# Patient Record
Sex: MALE | Race: WHITE | NOT HISPANIC OR LATINO | Employment: STUDENT | URBAN - METROPOLITAN AREA
[De-identification: names, ages, dates, MRNs, and addresses within clinical notes are randomized per-mention and may not be internally consistent; named-entity substitution may affect disease eponyms.]

---
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
## 2025-05-09 ENCOUNTER — APPOINTMENT (OUTPATIENT)
Dept: URGENT CARE | Facility: CLINIC | Age: 20
End: 2025-05-09

## 2025-06-20 ENCOUNTER — APPOINTMENT (EMERGENCY)
Dept: RADIOLOGY | Facility: HOSPITAL | Age: 20
End: 2025-06-20
Payer: COMMERCIAL

## 2025-06-20 ENCOUNTER — HOSPITAL ENCOUNTER (EMERGENCY)
Facility: HOSPITAL | Age: 20
Discharge: HOME | End: 2025-06-20
Attending: EMERGENCY MEDICINE | Admitting: EMERGENCY MEDICINE
Payer: COMMERCIAL

## 2025-06-20 VITALS
RESPIRATION RATE: 16 BRPM | HEART RATE: 80 BPM | SYSTOLIC BLOOD PRESSURE: 120 MMHG | TEMPERATURE: 98.7 F | OXYGEN SATURATION: 100 % | DIASTOLIC BLOOD PRESSURE: 80 MMHG

## 2025-06-20 DIAGNOSIS — S42.009A CLAVICLE FRACTURE: Primary | ICD-10-CM

## 2025-06-20 PROCEDURE — 71045 X-RAY EXAM CHEST 1 VIEW: CPT

## 2025-06-20 PROCEDURE — 99284 EMERGENCY DEPT VISIT MOD MDM: CPT | Performed by: EMERGENCY MEDICINE

## 2025-06-20 PROCEDURE — 99284 EMERGENCY DEPT VISIT MOD MDM: CPT

## 2025-06-20 PROCEDURE — 73000 X-RAY EXAM OF COLLAR BONE: CPT

## 2025-06-20 RX ORDER — IBUPROFEN 600 MG/1
600 TABLET, FILM COATED ORAL ONCE
Status: COMPLETED | OUTPATIENT
Start: 2025-06-20 | End: 2025-06-20

## 2025-06-20 RX ORDER — IBUPROFEN 600 MG/1
600 TABLET, FILM COATED ORAL EVERY 6 HOURS PRN
Qty: 30 TABLET | Refills: 0 | Status: SHIPPED | OUTPATIENT
Start: 2025-06-20

## 2025-06-20 RX ADMIN — IBUPROFEN 600 MG: 600 TABLET ORAL at 15:56
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
## 2025-06-20 NOTE — Clinical Note
Hudson Waldron was seen and treated in our emergency department on 6/20/2025.                Diagnosis:     Hudson  .    He may return on this date:     Unable to use right arm/lift until cleared by orthopedics     If you have any questions or concerns, please don't hesitate to call.      Mohit Garcia, DO    ______________________________           _______________          _______________  Hospital Representative                              Date                                Time

## 2025-06-20 NOTE — DISCHARGE INSTRUCTIONS
Wear your shoulder sling throughout the day and remove to shower and change  Follow up with orthopedics. A referral was placed and [facility] should call to schedule; alternatively you can call at the number provided

## 2025-06-25 ENCOUNTER — OFFICE VISIT (OUTPATIENT)
Dept: OBGYN CLINIC | Facility: CLINIC | Age: 20
End: 2025-06-25
Payer: COMMERCIAL

## 2025-06-25 ENCOUNTER — APPOINTMENT (OUTPATIENT)
Dept: RADIOLOGY | Facility: CLINIC | Age: 20
End: 2025-06-25
Attending: STUDENT IN AN ORGANIZED HEALTH CARE EDUCATION/TRAINING PROGRAM
Payer: COMMERCIAL

## 2025-06-25 VITALS — BODY MASS INDEX: 23.55 KG/M2 | WEIGHT: 159 LBS | HEIGHT: 69 IN

## 2025-06-25 DIAGNOSIS — M89.8X1 PAIN OF LEFT CLAVICLE: ICD-10-CM

## 2025-06-25 DIAGNOSIS — M89.8X1 PAIN OF RIGHT CLAVICLE: ICD-10-CM

## 2025-06-25 DIAGNOSIS — S42.021A CLOSED DISPLACED FRACTURE OF SHAFT OF RIGHT CLAVICLE, INITIAL ENCOUNTER: Primary | ICD-10-CM

## 2025-06-25 PROCEDURE — 73000 X-RAY EXAM OF COLLAR BONE: CPT

## 2025-06-25 PROCEDURE — 99204 OFFICE O/P NEW MOD 45 MIN: CPT | Performed by: STUDENT IN AN ORGANIZED HEALTH CARE EDUCATION/TRAINING PROGRAM
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
## 2025-06-25 NOTE — LETTER
June 25, 2025     Patient: Hudson Waldron  YOB: 2005  Date of Visit: 6/25/2025      To Whom it May Concern:    Hudson Waldron is under my professional care. Hudson was seen in my office on 6/25/2025. Hudson is not cleared to return to work at this time. He will be re-evaluated in 2 weeks.     If you have any questions or concerns, please don't hesitate to call.          Sincerely,          Alexandru Goodrich MD        CC: No Recipients

## 2025-06-26 ENCOUNTER — TELEPHONE (OUTPATIENT)
Age: 20
End: 2025-06-26

## 2025-06-26 RX ORDER — SODIUM CHLORIDE, SODIUM LACTATE, POTASSIUM CHLORIDE, CALCIUM CHLORIDE 600; 310; 30; 20 MG/100ML; MG/100ML; MG/100ML; MG/100ML
20 INJECTION, SOLUTION INTRAVENOUS CONTINUOUS
OUTPATIENT
Start: 2025-06-26

## 2025-06-26 RX ORDER — CHLORHEXIDINE GLUCONATE 40 MG/ML
SOLUTION TOPICAL DAILY PRN
OUTPATIENT
Start: 2025-06-26

## 2025-06-26 RX ORDER — CEFAZOLIN SODIUM 2 G/50ML
2000 SOLUTION INTRAVENOUS ONCE
OUTPATIENT
Start: 2025-06-26 | End: 2025-06-26

## 2025-06-26 RX ORDER — CHLORHEXIDINE GLUCONATE ORAL RINSE 1.2 MG/ML
15 SOLUTION DENTAL ONCE
OUTPATIENT
Start: 2025-06-26 | End: 2025-06-26

## 2025-06-26 NOTE — TELEPHONE ENCOUNTER
Caller: patient mom Lety     Doctor: Dr. Goodrich     Reason for call: calling to schedule sx,  said patient was seen yesterday and this was discussed in office.     Call back#: [phone]    Thank you.

## 2025-06-27 ENCOUNTER — LAB (OUTPATIENT)
Dept: LAB | Facility: HOSPITAL | Age: 20
End: 2025-06-27
Payer: COMMERCIAL

## 2025-06-27 ENCOUNTER — TELEPHONE (OUTPATIENT)
Dept: OBGYN CLINIC | Facility: CLINIC | Age: 20
End: 2025-06-27

## 2025-06-27 DIAGNOSIS — S42.021A CLOSED DISPLACED FRACTURE OF SHAFT OF RIGHT CLAVICLE, INITIAL ENCOUNTER: ICD-10-CM

## 2025-06-27 LAB
ALBUMIN SERPL BCG-MCNC: 4.2 G/DL (ref 3.5–5)
ALP SERPL-CCNC: 57 U/L (ref 34–104)
ALT SERPL W P-5'-P-CCNC: 12 U/L (ref 7–52)
ANION GAP SERPL CALCULATED.3IONS-SCNC: 8 MMOL/L (ref 4–13)
AST SERPL W P-5'-P-CCNC: 15 U/L (ref 13–39)
BASOPHILS # BLD AUTO: 0.05 THOUSANDS/ÂΜL (ref 0–0.1)
BASOPHILS NFR BLD AUTO: 1 % (ref 0–1)
BILIRUB SERPL-MCNC: 0.59 MG/DL (ref 0.2–1)
BUN SERPL-MCNC: 23 MG/DL (ref 5–25)
CALCIUM SERPL-MCNC: 9.3 MG/DL (ref 8.4–10.2)
CHLORIDE SERPL-SCNC: 102 MMOL/L (ref 96–108)
CO2 SERPL-SCNC: 29 MMOL/L (ref 21–32)
CREAT SERPL-MCNC: 1.03 MG/DL (ref 0.6–1.3)
EOSINOPHIL # BLD AUTO: 0.2 THOUSAND/ÂΜL (ref 0–0.61)
EOSINOPHIL NFR BLD AUTO: 2 % (ref 0–6)
ERYTHROCYTE [DISTWIDTH] IN BLOOD BY AUTOMATED COUNT: 13.5 % (ref 11.6–15.1)
GFR SERPL CREATININE-BSD FRML MDRD: 104 ML/MIN/1.73SQ M
GLUCOSE P FAST SERPL-MCNC: 78 MG/DL (ref 65–99)
HCT VFR BLD AUTO: 47.7 % (ref 36.5–49.3)
HGB BLD-MCNC: 15.4 G/DL (ref 12–17)
IMM GRANULOCYTES # BLD AUTO: 0.03 THOUSAND/UL (ref 0–0.2)
IMM GRANULOCYTES NFR BLD AUTO: 0 % (ref 0–2)
LYMPHOCYTES # BLD AUTO: 1.84 THOUSANDS/ÂΜL (ref 0.6–4.47)
LYMPHOCYTES NFR BLD AUTO: 22 % (ref 14–44)
MCH RBC QN AUTO: 29.6 PG (ref 26.8–34.3)
MCHC RBC AUTO-ENTMCNC: 32.3 G/DL (ref 31.4–37.4)
MCV RBC AUTO: 92 FL (ref 82–98)
MONOCYTES # BLD AUTO: 0.88 THOUSAND/ÂΜL (ref 0.17–1.22)
MONOCYTES NFR BLD AUTO: 11 % (ref 4–12)
NEUTROPHILS # BLD AUTO: 5.21 THOUSANDS/ÂΜL (ref 1.85–7.62)
NEUTS SEG NFR BLD AUTO: 64 % (ref 43–75)
NRBC BLD AUTO-RTO: 0 /100 WBCS
PLATELET # BLD AUTO: 244 THOUSANDS/UL (ref 149–390)
PMV BLD AUTO: 11.5 FL (ref 8.9–12.7)
POTASSIUM SERPL-SCNC: 4 MMOL/L (ref 3.5–5.3)
PROT SERPL-MCNC: 6.5 G/DL (ref 6.4–8.4)
RBC # BLD AUTO: 5.21 MILLION/UL (ref 3.88–5.62)
SODIUM SERPL-SCNC: 139 MMOL/L (ref 135–147)
WBC # BLD AUTO: 8.21 THOUSAND/UL (ref 4.31–10.16)

## 2025-06-27 PROCEDURE — 36415 COLL VENOUS BLD VENIPUNCTURE: CPT

## 2025-06-27 PROCEDURE — 80053 COMPREHEN METABOLIC PANEL: CPT

## 2025-06-27 PROCEDURE — 85025 COMPLETE CBC W/AUTO DIFF WBC: CPT

## 2025-06-27 NOTE — PRE-PROCEDURE INSTRUCTIONS
Pre-Surgery Instructions:   Medication Instructions    ibuprofen (MOTRIN) 600 mg tablet Stop taking 7 days prior to surgery.    Multiple Vitamin (Multi-Vitamin Daily) TABS Stop taking 7 days prior to surgery.   Medication instructions for day of surgery reviewed. Please take all instructed medications with only a sip of water. Please do not take any over the counter (non-prescribed) vitamins or supplements for one week prior to date of surgery.      You will receive a call one business day prior to surgery with an arrival time and hospital directions. If your surgery is scheduled on a Monday, the hospital will be calling you on the Friday prior to your surgery. If you have not heard from anyone by 8pm, please call the hospital supervisor through the hospital  at [phone]. ([place] [phone] or [place] [phone]).    Do not eat or drink anything after midnight the night before your surgery, including candy, mints, lifesavers, or chewing gum. Do not drink alcohol 24hrs before your surgery. Try not to smoke at least 24hrs before your surgery.       Follow the pre surgery showering instructions as listed in the “My Surgical Experience Booklet” or otherwise provided by your surgeon's office. Do not use a blade to shave the surgical area 1 week before surgery. It is okay to use a clean electric clippers up to 24 hours before surgery. Do not apply any lotions, creams, including makeup, cologne, deodorant, or perfumes after showering on the day of your surgery. Do not use dry shampoo, hair spray, hair gel, or any type of hair products.     No contact lenses, eye make-up, or artificial eyelashes. Remove nail polish, including gel polish, and any artificial, gel, or acrylic nails if possible. Remove all jewelry including rings and body piercing jewelry.     Wear causal clothing that is easy to take on and off. Consider your type of surgery.    Keep any valuables, jewelry, piercings at home. Please bring  any specially ordered equipment (sling, braces) if indicated.    Arrange for a responsible person to drive you to and from the hospital on the day of your surgery. Please confirm the visitor policy for the day of your procedure when you receive your phone call with an arrival time.     Call the surgeon's office with any new illnesses, exposures, or additional questions prior to surgery.    Please reference your “My Surgical Experience Booklet” for additional information to prepare for your upcoming surgery.

## 2025-07-01 ENCOUNTER — HOSPITAL ENCOUNTER (OUTPATIENT)
Facility: HOSPITAL | Age: 20
Setting detail: OUTPATIENT SURGERY
Discharge: HOME | End: 2025-07-01
Attending: STUDENT IN AN ORGANIZED HEALTH CARE EDUCATION/TRAINING PROGRAM | Admitting: STUDENT IN AN ORGANIZED HEALTH CARE EDUCATION/TRAINING PROGRAM
Payer: COMMERCIAL

## 2025-07-01 ENCOUNTER — APPOINTMENT (OUTPATIENT)
Dept: RADIOLOGY | Facility: HOSPITAL | Age: 20
End: 2025-07-01
Payer: COMMERCIAL

## 2025-07-01 ENCOUNTER — ANESTHESIA (OUTPATIENT)
Dept: PERIOP | Facility: HOSPITAL | Age: 20
End: 2025-07-01
Payer: COMMERCIAL

## 2025-07-01 ENCOUNTER — ANESTHESIA EVENT (OUTPATIENT)
Dept: PERIOP | Facility: HOSPITAL | Age: 20
End: 2025-07-01
Payer: COMMERCIAL

## 2025-07-01 VITALS
HEART RATE: 87 BPM | SYSTOLIC BLOOD PRESSURE: 150 MMHG | TEMPERATURE: 97.8 F | RESPIRATION RATE: 18 BRPM | DIASTOLIC BLOOD PRESSURE: 75 MMHG | OXYGEN SATURATION: 99 %

## 2025-07-01 DIAGNOSIS — S42.021D CLOSED DISPLACED FRACTURE OF SHAFT OF RIGHT CLAVICLE WITH ROUTINE HEALING, SUBSEQUENT ENCOUNTER: ICD-10-CM

## 2025-07-01 DIAGNOSIS — Z87.81 S/P ORIF (OPEN REDUCTION INTERNAL FIXATION) FRACTURE: Primary | ICD-10-CM

## 2025-07-01 DIAGNOSIS — Z98.890 S/P ORIF (OPEN REDUCTION INTERNAL FIXATION) FRACTURE: Primary | ICD-10-CM

## 2025-07-01 PROBLEM — S42.021A CLOSED DISPLACED FRACTURE OF SHAFT OF RIGHT CLAVICLE: Status: ACTIVE | Noted: 2025-07-01

## 2025-07-01 PROCEDURE — 73000 X-RAY EXAM OF COLLAR BONE: CPT

## 2025-07-01 PROCEDURE — NC001 PR NO CHARGE: Performed by: STUDENT IN AN ORGANIZED HEALTH CARE EDUCATION/TRAINING PROGRAM

## 2025-07-01 PROCEDURE — 23515 OPTX CLAVICULAR FX W/INT FIX: CPT | Performed by: STUDENT IN AN ORGANIZED HEALTH CARE EDUCATION/TRAINING PROGRAM

## 2025-07-01 PROCEDURE — C1713 ANCHOR/SCREW BN/BN,TIS/BN: HCPCS | Performed by: STUDENT IN AN ORGANIZED HEALTH CARE EDUCATION/TRAINING PROGRAM

## 2025-07-01 PROCEDURE — 23515 OPTX CLAVICULAR FX W/INT FIX: CPT

## 2025-07-01 DEVICE — LO-PRO SCRW TM,SS 3.5X 14MMCORT
Type: IMPLANTABLE DEVICE | Site: CLAVICLE | Status: FUNCTIONAL
Brand: ARTHREX®

## 2025-07-01 DEVICE — LO-PRO SCRW TM,SS 3.5X 16MMCORT
Type: IMPLANTABLE DEVICE | Site: CLAVICLE | Status: FUNCTIONAL
Brand: ARTHREX®

## 2025-07-01 DEVICE — CANCLUS SCRW,FULLY THRDED,SS 3X 16MM
Type: IMPLANTABLE DEVICE | Site: CLAVICLE | Status: FUNCTIONAL
Brand: ARTHREX®

## 2025-07-01 DEVICE — ALLOSYNC CB DBM PASTE, 3CC SYRINGE
Type: IMPLANTABLE DEVICE | Site: CLAVICLE | Status: FUNCTIONAL
Brand: ARTHREX

## 2025-07-01 DEVICE — DISTAL CLAVICLE PLATE, 8 HOLE, RIGHT, SS
Type: IMPLANTABLE DEVICE | Site: CLAVICLE | Status: FUNCTIONAL
Brand: ARTHREX®

## 2025-07-01 DEVICE — CANCLUS SCRW,FULLY THRDED,SS 3X 14MM
Type: IMPLANTABLE DEVICE | Site: CLAVICLE | Status: FUNCTIONAL
Brand: ARTHREX®

## 2025-07-01 RX ORDER — FENTANYL CITRATE 50 UG/ML
INJECTION, SOLUTION INTRAMUSCULAR; INTRAVENOUS AS NEEDED
Status: DISCONTINUED | OUTPATIENT
Start: 2025-07-01 | End: 2025-07-01

## 2025-07-01 RX ORDER — DEXAMETHASONE SODIUM PHOSPHATE 10 MG/ML
INJECTION, SOLUTION INTRAMUSCULAR; INTRAVENOUS AS NEEDED
Status: DISCONTINUED | OUTPATIENT
Start: 2025-07-01 | End: 2025-07-01

## 2025-07-01 RX ORDER — SODIUM CHLORIDE, SODIUM LACTATE, POTASSIUM CHLORIDE, CALCIUM CHLORIDE 600; 310; 30; 20 MG/100ML; MG/100ML; MG/100ML; MG/100ML
20 INJECTION, SOLUTION INTRAVENOUS CONTINUOUS
Status: DISCONTINUED | OUTPATIENT
Start: 2025-07-01 | End: 2025-07-01 | Stop reason: HOSPADM

## 2025-07-01 RX ORDER — ACETAMINOPHEN 500 MG
1000 TABLET ORAL EVERY 8 HOURS
Qty: 168 TABLET | Refills: 0 | Status: SHIPPED | OUTPATIENT
Start: 2025-07-01 | End: 2025-07-29

## 2025-07-01 RX ORDER — ASPIRIN 81 MG/1
81 TABLET, CHEWABLE ORAL 2 TIMES DAILY
Qty: 28 TABLET | Refills: 0 | Status: SHIPPED | OUTPATIENT
Start: 2025-07-01 | End: 2025-07-16

## 2025-07-01 RX ORDER — CHLORHEXIDINE GLUCONATE ORAL RINSE 1.2 MG/ML
15 SOLUTION DENTAL ONCE
Status: COMPLETED | OUTPATIENT
Start: 2025-07-01 | End: 2025-07-01

## 2025-07-01 RX ORDER — MELOXICAM 15 MG/1
15 TABLET ORAL DAILY
Qty: 28 TABLET | Refills: 0 | Status: SHIPPED | OUTPATIENT
Start: 2025-07-01

## 2025-07-01 RX ORDER — ROCURONIUM BROMIDE 10 MG/ML
INJECTION, SOLUTION INTRAVENOUS AS NEEDED
Status: DISCONTINUED | OUTPATIENT
Start: 2025-07-01 | End: 2025-07-01

## 2025-07-01 RX ORDER — OXYCODONE HYDROCHLORIDE 5 MG/1
5 TABLET ORAL EVERY 4 HOURS PRN
Qty: 10 TABLET | Refills: 0 | Status: SHIPPED | OUTPATIENT
Start: 2025-07-01 | End: 2025-07-11

## 2025-07-01 RX ORDER — CEFADROXIL 500 MG/1
500 CAPSULE ORAL EVERY 12 HOURS SCHEDULED
Qty: 14 CAPSULE | Refills: 0 | Status: SHIPPED | OUTPATIENT
Start: 2025-07-01 | End: 2025-07-09

## 2025-07-01 RX ORDER — MAGNESIUM HYDROXIDE 1200 MG/15ML
LIQUID ORAL AS NEEDED
Status: DISCONTINUED | OUTPATIENT
Start: 2025-07-01 | End: 2025-07-01 | Stop reason: HOSPADM

## 2025-07-01 RX ORDER — ONDANSETRON 2 MG/ML
INJECTION INTRAMUSCULAR; INTRAVENOUS AS NEEDED
Status: DISCONTINUED | OUTPATIENT
Start: 2025-07-01 | End: 2025-07-01

## 2025-07-01 RX ORDER — MIDAZOLAM HYDROCHLORIDE 2 MG/2ML
INJECTION, SOLUTION INTRAMUSCULAR; INTRAVENOUS AS NEEDED
Status: DISCONTINUED | OUTPATIENT
Start: 2025-07-01 | End: 2025-07-01

## 2025-07-01 RX ORDER — CEFAZOLIN SODIUM 2 G/50ML
2000 SOLUTION INTRAVENOUS ONCE
Status: COMPLETED | OUTPATIENT
Start: 2025-07-01 | End: 2025-07-01

## 2025-07-01 RX ORDER — GLYCOPYRROLATE 0.2 MG/ML
INJECTION INTRAMUSCULAR; INTRAVENOUS AS NEEDED
Status: DISCONTINUED | OUTPATIENT
Start: 2025-07-01 | End: 2025-07-01

## 2025-07-01 RX ORDER — ACETAMINOPHEN 10 MG/ML
1000 INJECTION, SOLUTION INTRAVENOUS ONCE
Status: COMPLETED | OUTPATIENT
Start: 2025-07-01 | End: 2025-07-01

## 2025-07-01 RX ORDER — PROPOFOL 10 MG/ML
INJECTION, EMULSION INTRAVENOUS AS NEEDED
Status: DISCONTINUED | OUTPATIENT
Start: 2025-07-01 | End: 2025-07-01

## 2025-07-01 RX ORDER — CHLORHEXIDINE GLUCONATE 40 MG/ML
SOLUTION TOPICAL DAILY PRN
Status: DISCONTINUED | OUTPATIENT
Start: 2025-07-01 | End: 2025-07-01 | Stop reason: HOSPADM

## 2025-07-01 RX ORDER — ONDANSETRON 4 MG/1
4 TABLET, FILM COATED ORAL EVERY 8 HOURS PRN
Qty: 10 TABLET | Refills: 0 | Status: SHIPPED | OUTPATIENT
Start: 2025-07-01

## 2025-07-01 RX ORDER — SODIUM CHLORIDE, SODIUM LACTATE, POTASSIUM CHLORIDE, CALCIUM CHLORIDE 600; 310; 30; 20 MG/100ML; MG/100ML; MG/100ML; MG/100ML
INJECTION, SOLUTION INTRAVENOUS CONTINUOUS PRN
Status: DISCONTINUED | OUTPATIENT
Start: 2025-07-01 | End: 2025-07-01

## 2025-07-01 RX ORDER — FENTANYL CITRATE/PF 50 MCG/ML
25 SYRINGE (ML) INJECTION
Status: COMPLETED | OUTPATIENT
Start: 2025-07-01 | End: 2025-07-01

## 2025-07-01 RX ORDER — BUPIVACAINE HYDROCHLORIDE AND EPINEPHRINE 5; 5 MG/ML; UG/ML
INJECTION, SOLUTION EPIDURAL; INTRACAUDAL; PERINEURAL AS NEEDED
Status: DISCONTINUED | OUTPATIENT
Start: 2025-07-01 | End: 2025-07-01 | Stop reason: HOSPADM

## 2025-07-01 RX ORDER — OXYCODONE HYDROCHLORIDE 5 MG/1
5 TABLET ORAL EVERY 4 HOURS PRN
Status: DISCONTINUED | OUTPATIENT
Start: 2025-07-01 | End: 2025-07-01 | Stop reason: HOSPADM

## 2025-07-01 RX ORDER — ONDANSETRON 2 MG/ML
4 INJECTION INTRAMUSCULAR; INTRAVENOUS ONCE AS NEEDED
Status: DISCONTINUED | OUTPATIENT
Start: 2025-07-01 | End: 2025-07-01 | Stop reason: HOSPADM

## 2025-07-01 RX ADMIN — FENTANYL CITRATE 50 MCG: 50 INJECTION, SOLUTION INTRAMUSCULAR; INTRAVENOUS at 14:37

## 2025-07-01 RX ADMIN — SODIUM CHLORIDE, SODIUM LACTATE, POTASSIUM CHLORIDE, AND CALCIUM CHLORIDE: .6; .31; .03; .02 INJECTION, SOLUTION INTRAVENOUS at 14:06

## 2025-07-01 RX ADMIN — GLYCOPYRROLATE 0.1 MG: 0.2 INJECTION INTRAMUSCULAR; INTRAVENOUS at 14:12

## 2025-07-01 RX ADMIN — ROCURONIUM BROMIDE 50 MG: 10 INJECTION, SOLUTION INTRAVENOUS at 14:12

## 2025-07-01 RX ADMIN — FENTANYL CITRATE 50 MCG: 50 INJECTION, SOLUTION INTRAMUSCULAR; INTRAVENOUS at 14:12

## 2025-07-01 RX ADMIN — FENTANYL CITRATE 25 MCG: 50 INJECTION INTRAMUSCULAR; INTRAVENOUS at 19:04

## 2025-07-01 RX ADMIN — Medication 8 MCG: at 14:12

## 2025-07-01 RX ADMIN — SUGAMMADEX 160 MG: 100 INJECTION, SOLUTION INTRAVENOUS at 17:48

## 2025-07-01 RX ADMIN — OXYCODONE HYDROCHLORIDE 5 MG: 5 TABLET ORAL at 19:33

## 2025-07-01 RX ADMIN — CHLORHEXIDINE GLUCONATE 15 ML: 1.2 SOLUTION ORAL at 11:43

## 2025-07-01 RX ADMIN — PROPOFOL 200 MG: 10 INJECTION, EMULSION INTRAVENOUS at 14:12

## 2025-07-01 RX ADMIN — DEXAMETHASONE SODIUM PHOSPHATE 8 MG: 10 INJECTION, SOLUTION INTRAMUSCULAR; INTRAVENOUS at 14:12

## 2025-07-01 RX ADMIN — FENTANYL CITRATE 25 MCG: 50 INJECTION INTRAMUSCULAR; INTRAVENOUS at 19:14

## 2025-07-01 RX ADMIN — MIDAZOLAM 2 MG: 1 INJECTION INTRAMUSCULAR; INTRAVENOUS at 14:06

## 2025-07-01 RX ADMIN — FENTANYL CITRATE 50 MCG: 50 INJECTION, SOLUTION INTRAMUSCULAR; INTRAVENOUS at 15:52

## 2025-07-01 RX ADMIN — ACETAMINOPHEN 1000 MG: 10 INJECTION INTRAVENOUS at 18:51

## 2025-07-01 RX ADMIN — CEFAZOLIN SODIUM 2000 MG: 2 SOLUTION INTRAVENOUS at 14:16

## 2025-07-01 RX ADMIN — FENTANYL CITRATE 25 MCG: 50 INJECTION INTRAMUSCULAR; INTRAVENOUS at 18:58

## 2025-07-01 RX ADMIN — SODIUM CHLORIDE, SODIUM LACTATE, POTASSIUM CHLORIDE, AND CALCIUM CHLORIDE: .6; .31; .03; .02 INJECTION, SOLUTION INTRAVENOUS at 15:00

## 2025-07-01 RX ADMIN — ONDANSETRON 4 MG: 2 INJECTION INTRAMUSCULAR; INTRAVENOUS at 14:22

## 2025-07-01 RX ADMIN — FENTANYL CITRATE 25 MCG: 50 INJECTION INTRAMUSCULAR; INTRAVENOUS at 19:09

## 2025-07-01 RX ADMIN — FENTANYL CITRATE 50 MCG: 50 INJECTION, SOLUTION INTRAMUSCULAR; INTRAVENOUS at 16:47

## 2025-07-01 NOTE — ANESTHESIA POSTPROCEDURE EVALUATION
Post-Op Assessment Note    CV Status:  Stable  Pain Score: 5    Pain management: satisfactory to patient    Multimodal analgesia used between 6 hours prior to anesthesia start to PACU discharge    Mental Status:  Alert and awake   Hydration Status:  Euvolemic   PONV Controlled:  Controlled   Airway Patency:  Patent     Post Op Vitals Reviewed: Yes    No anethesia notable event occurred.    Staff: CRNA, Anesthesiologist           Last Filed PACU Vitals:  Vitals Value Taken Time   Temp 97.8 °F (36.6 °C) 07/01/25 18:04   Pulse 87 07/01/25 19:10   /71 07/01/25 19:00   Resp 17 07/01/25 19:10   SpO2 97 % 07/01/25 19:10   Vitals shown include unfiled device data.    Modified Teresa:     Vitals Value Taken Time   Activity 2 07/01/25 18:45   Respiration 2 07/01/25 18:45   Circulation 2 07/01/25 18:45   Consciousness 1 07/01/25 18:45   Oxygen Saturation 2 07/01/25 18:45     Modified Teresa Score: 9
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
## 2025-07-01 NOTE — ANESTHESIA POSTPROCEDURE EVALUATION
Post-Op Assessment Note    CV Status:  Stable  Pain Score: 0    Pain management: adequate       Mental Status:  Arousable and sleepy   Hydration Status:  Euvolemic   PONV Controlled:  Controlled   Airway Patency:  Patent     Post Op Vitals Reviewed: Yes    No anethesia notable event occurred.    Staff: CRNA           Last Filed PACU Vitals:  Vitals Value Taken Time   Temp 97.8    Pulse 95 07/01/25 18:11   /67 07/01/25 18:06   Resp 18 07/01/25 18:11   SpO2 100 % 07/01/25 18:11   Vitals shown include unfiled device data.

## 2025-07-01 NOTE — INTERIM OP NOTE
Right clavicle ORIF  Postoperative Note  PATIENT NAME: Hudson Waldron  : 2005  MRN: [id number]  [facility] ROOM 02    Surgery Date: 2025    Preop Diagnosis:  Closed displaced fracture of shaft of right clavicle, initial encounter [S42.021A]    Post-Op Diagnosis Codes:     * Closed displaced fracture of shaft of right clavicle, initial encounter [S42.021A]    Procedure(s) (LRB):  Right clavicle ORIF (Right)    Surgeons and Role:     * Alexandru Goodrich MD - Primary     * Jermaine Tejada PA-C - Assisting    Specimens:  * No specimens in log *    Estimated Blood Loss:   Minimal    Anesthesia Type:   Choice     Findings:   Comminuted, displaced clavicle fracture    Complications:   None      SIGNATURE: Alexandru Goodrich MD   DATE: 2025   TIME: 6:09 PM

## 2025-07-01 NOTE — DISCHARGE INSTR - AVS FIRST PAGE
POSTOPERATIVE INSTRUCTIONS - SHOULDER SURGERY    MEDICATIONS:  - Resume all home medications unless otherwise instructed by your surgeon.  - Pain Medication:  Oxycodone (5 mg, 1 tablet every 4 hours as needed) and Tylenol (500 mg, 2 tablets every 8 hours for 4 weeks)  - If you were given a regional anesthetic (nerve block), please begin taking the pain medication as soon as you get home, even if you have minimal or no pain.  DO NOT WAIT FOR THE NERVE BLOCK TO WEAR OFF.  - Possible side effects include nausea, constipation, and urinary retention.  If you experience these side effects, please call our office for assistance.  - Pain medication refills are authorized only during office hours (8am-4pm, Mon-Fri).  - Anti-Inflammatory:  Meloxicam (15 mg, 1 tablet daily for 4 weeks)   - TAKE WITH FOOD.  Stop if you experience nausea, reflux, or stomach pain.  - Do not take other anti-inflammatory medications along with meloxicam such as ibuprofen, diclofenac advil, naproxen, etc.  - Nausea Medication:  Zofran (4 mg, take one tablet every 8 hours as needed for nausea or vomiting)  - Blood clot prevention: Aspirin (81 mg, 1 tablet twice daily for 2 weeks)  - Antibiotics: Duricef (500 mg, 1 tablet twice daily for 7 days)    WOUND CARE:  - Maintain your operative dressing. Keep the dressings clean and dry.    - It is normal for the shoulder to bleed and swell following surgery. If blood soaks through the bandage, do not become alarmed, reinforce with additional dressing.  - Dressings should remain in place until your first post-operative appointment. Do not remove any of the dressings prior to the appointment.  - DO NOT place shoulder incisions under water (bath, pool) until given approval by our office.  - Please call our office ([phone]) if you experience either of the following:  - Sudden increase in swelling, redness, or warmth at the surgical site  - Excessive incisional drainage that persists beyond the 3rd day after  surgery  - Oral temperature greater than 101 degrees, not relieved with Tylenol  - Shortness of breath, chest pain, nausea, or any other concerning symptoms  - Severe distal arm pain or significant swelling of the distal arm and/or hand.    SLING:  - Wear your sling at all times, including sleep.  - You may come out of the sling only for hygiene, dressing, and home exercises including elbow/wrist/finger range of motion exercises.    ACTIVITY:   - You are non-weightbearing on the operative arm.  - DO NOT lift, carry, push, or pull anything with your operative arm.  - Place a pillow behind the elbow while lying down.  - Sleeping in a more upright position (recliner) may be more comfortable initially.  - Avoid long periods of sitting or long distance traveling for 2 weeks.  - May return to sedentary work ONLY or school 3-4 days after surgery, if pain is tolerable    DRIVING:  - You are not allowed to drive while on narcotic medications (oxycodone) or while in a sling.    DIET:  - Begin with clear liquids and light foods (jellos, soups, etc.)  - Progress to your normal diet if you are not nauseated    SWELLING CONTROL:  - Icing is very important in the initial post-operative period and should begin immediately after surgery.  - Cold Therapy: apply ice (20 min on, 20 min off) as often as you feel is necessary.  - Care should be taken when icing to avoid frostbite to the skin.    PHYSICAL THERAPY:  - While maintaining your elbow by the side, begin elbow, hand, and wrist range of motion exercises 24 hours after surgery.  - Formal physical therapy (PT) typically begins after you are seen at your 1st post-operative appointment 1 week after surgery. A prescription and protocol will be provided at your 1st post-operative visit.  - You can call your preferred physical therapy office to schedule your first visit if you have not done so already.    FOLLOW-UP APPOINTMENT:  - You have your first post-op appointment scheduled for  7/9/2025 at the [facility] with Dr. Goodrich  - If you do not already have a post-operative appointment scheduled, please contact our office at [phone] to schedule.  - Typically the 1st post-operative appointment following surgery is 7-10 days following surgery  - At the first post-operative visit, Dr. Goodrich will do a wound check, go over therapy protocols and answer any questions you may have.  - If you have any further questions please contact the office.    **EMERGENCIES**  - Contact Dr. Goodrich's office at [phone] if any of the following are present:  - Painful swelling or numbness (note that some swelling and numbness is normal)  - Unrelenting pain  - Fever (over 101° - it is normal to have a low-grade fever or chills for the 1st day or 2 following surgery)  - Redness around incisions  - Color change in the operative extremity  - Continuous drainage or bleeding from incision (a small amount of drainage is expected)  - Difficulty breathing  - Excessive nausea/vomiting  - Calf pain  - If you have an emergency after office hours or on the weekend, proceed to the nearest emergency room.

## 2025-07-01 NOTE — ANESTHESIA PREPROCEDURE EVALUATION
Procedure:  Right clavicle ORIF (Right: Chest)    Relevant Problems   No relevant active problems        Physical Exam    Airway     Mallampati score: III  TM Distance: >3 FB  Neck ROM: full      Cardiovascular  Cardiovascular exam normal    Dental       Pulmonary  Pulmonary exam normal     Neurological      Other Findings        Anesthesia Plan  ASA Score- 2     Anesthesia Type- general with ASA Monitors.         Additional Monitors:     Airway Plan: Oral ETT.           Plan Factors-Exercise tolerance (METS): >4 METS.    Chart reviewed. EKG reviewed. Imaging results reviewed. Existing labs reviewed. Patient summary reviewed.    Patient is not a current smoker.              Induction- intravenous.    Postoperative Plan- .   Monitoring Plan - Monitoring plan - standard ASA monitoring          Informed Consent- Anesthetic plan and risks discussed with patient.  I personally reviewed this patient with the CRNA. Discussed and agreed on the Anesthesia Plan with the CRNA..      NPO Status:  Vitals Value Taken Time   Date of last liquid 06/30/25 07/01/25 11:41   Time of last liquid 2300 07/01/25 11:41   Date of last solid 06/30/25 07/01/25 11:41   Time of last solid 2300 07/01/25 11:41

## 2025-07-03 NOTE — QUICK NOTE
Post Op Check Note        Subjective:  I saw the patient in the PACU and updated the patient that surgery went well without any complications.  I briefly discussed the intra-operative findings and procedure that was performed.  I also called the patient's designated  to update them on the procedure and that the patient was now in the PACU. The patient's pain was well controlled.       Physical Exam:   Right upper extremity  Dressings clean, dry, intact  Sensation intact to light touch in the axillary, median, ulnar, and radial nerves   Radial/median/ulnar/PIN/AIN/MC nerve motor intact  Sensation to light touch over the radial aspect of the forearm  2+ radial pulse  Fingers are warm and well-perfused.     Assessment:  20 y.o. male status post right clavicle ORIF     Plan:   Patient was provided with discharge instructions and post-op medications were sent to the designated pharmacy.  The patient has a follow-up appointment scheduled in 1 week.  The patient can reach out to the clinic with any questions or concerns prior to the first postoperative visit.        Alexandru Goodrich MD
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
## 2025-07-09 ENCOUNTER — OFFICE VISIT (OUTPATIENT)
Dept: OBGYN CLINIC | Facility: CLINIC | Age: 20
End: 2025-07-09

## 2025-07-09 ENCOUNTER — APPOINTMENT (OUTPATIENT)
Dept: RADIOLOGY | Facility: CLINIC | Age: 20
End: 2025-07-09
Attending: STUDENT IN AN ORGANIZED HEALTH CARE EDUCATION/TRAINING PROGRAM
Payer: COMMERCIAL

## 2025-07-09 VITALS — HEIGHT: 69 IN | BODY MASS INDEX: 23.55 KG/M2 | WEIGHT: 159 LBS

## 2025-07-09 DIAGNOSIS — S42.021A CLOSED DISPLACED FRACTURE OF SHAFT OF RIGHT CLAVICLE, INITIAL ENCOUNTER: Primary | ICD-10-CM

## 2025-07-09 DIAGNOSIS — S42.021A CLOSED DISPLACED FRACTURE OF SHAFT OF RIGHT CLAVICLE, INITIAL ENCOUNTER: ICD-10-CM

## 2025-07-09 PROCEDURE — 99024 POSTOP FOLLOW-UP VISIT: CPT | Performed by: STUDENT IN AN ORGANIZED HEALTH CARE EDUCATION/TRAINING PROGRAM

## 2025-07-09 PROCEDURE — 73000 X-RAY EXAM OF COLLAR BONE: CPT

## 2025-07-09 NOTE — PROGRESS NOTES
Shoulder Post Operative Visit      Assessment & Plan  Closed displaced fracture of shaft of right clavicle, initial encounter    Orders:    XR clavicle right; Future      20 y.o. male s/p ORIF right clavicle, DOS: 7/1/2025    The patient is doing well 1 week s/p the above procedure. The surgical dressings were removed at time of visit, the steri strips remain in place. There is no evidence of erythema, dehiscence, or drainage. The patient may expose the incision for showering, however, he was advised not to submerge or scrub the incision. Xrays from today's visit show the surgical hardware intact. The patient's pain is well controlled. He should continue to wear the sling at all times. He has completed antibiotics as instructed. The patient was advised to continue 81mg aspirin BID for DVT prophylaxis. He is not currently taking any pain medications. He will follow-up in 1 week for re-evaluation and suture removal. The patient demonstrated understanding of the discussion and was in agreement with the plan. All of the questions were answered. Patient can reach out to clinic with any questions or concerns at any time.     Patient was advised that if they have any fevers, chills, chest pain, shortness of breath, redness or drainage from the incision, please let our office know immediately.      Weight bearing status: NWB RUE  ROM: none   Brace/Sling status: sling at all times     DVT Prophylaxis: aspirin 81mg BID   Follow up: 1 week - suture removal    Repeat Imaging: none         History of Present Illness:  The patient is a 20 y.o. male who is being evaluated post operatively 1 weeks status post ORIF right clavicle.    The patient states that overall they are doing well since surgery. The average pain is 0/10. Patient is not currently taking any pain medications.They are currently using a sling. They are nonweightbearing of the right upper extremity. The patient is taking 81mg aspirin for DVT ppx.  The patient denies  any fevers, chills, calf pain, chest pain/shortness of breath, redness or drainage from the incision.       I have reviewed the past medical, surgical, social and family history, medications and allergies as documented in the EMR.      Physical Exam:  right Shoulder focused exam:    Incisions CD, sutures in place with no erythema, no drainage, no dehiscence  Tender to palpation: none  ROM: deferred   Rotator cuff strength: deferred     UE NV Exam:   +2 Radial pulses   Sensation intact to light touch C5-T1 bilaterally, SILT median/ulnar/radial distribution  Radial/median/ulnar/PIN/AIN nerve motor intact      This note was written with the use of dictation software. Please excuse any errors.      Scribe Attestation      I,:  Robert Deluna am acting as a scribe while in the presence of the attending physician.:       I,:  Alexandru Goodrich MD personally performed the services described in this documentation    as scribed in my presence.:

## 2025-07-16 ENCOUNTER — APPOINTMENT (OUTPATIENT)
Dept: RADIOLOGY | Facility: CLINIC | Age: 20
End: 2025-07-16
Attending: STUDENT IN AN ORGANIZED HEALTH CARE EDUCATION/TRAINING PROGRAM
Payer: COMMERCIAL

## 2025-07-16 ENCOUNTER — OFFICE VISIT (OUTPATIENT)
Dept: OBGYN CLINIC | Facility: CLINIC | Age: 20
End: 2025-07-16

## 2025-07-16 VITALS — WEIGHT: 160 LBS | HEIGHT: 69 IN | BODY MASS INDEX: 23.7 KG/M2

## 2025-07-16 DIAGNOSIS — S42.021A CLOSED DISPLACED FRACTURE OF SHAFT OF RIGHT CLAVICLE, INITIAL ENCOUNTER: ICD-10-CM

## 2025-07-16 DIAGNOSIS — Z87.81 S/P ORIF (OPEN REDUCTION INTERNAL FIXATION) FRACTURE: Primary | ICD-10-CM

## 2025-07-16 DIAGNOSIS — Z98.890 S/P ORIF (OPEN REDUCTION INTERNAL FIXATION) FRACTURE: Primary | ICD-10-CM

## 2025-07-16 PROCEDURE — 73000 X-RAY EXAM OF COLLAR BONE: CPT

## 2025-07-16 PROCEDURE — 99024 POSTOP FOLLOW-UP VISIT: CPT | Performed by: STUDENT IN AN ORGANIZED HEALTH CARE EDUCATION/TRAINING PROGRAM

## 2025-07-16 NOTE — PROGRESS NOTES
Shoulder Post Operative Visit      Assessment & Plan  Closed displaced fracture of shaft of right clavicle, initial encounter    Orders:    XR clavicle right; Future    Ambulatory Referral to Physical Therapy; Future    S/P ORIF (open reduction internal fixation) fracture           20 y.o. male s/p ORIF right clavicle, DOS: 7/1/2025    The patient is doing well 2 weeks s/p the above procedure.  Patient has no pain at this point.  The steri strips were intact and removed in clinic. There is no evidence of erythema, dehiscence, or drainage. Xrays from today's visit show the surgical hardware intact with no evidence of loosening or malfunction. Fracture in acceptable alignment with no change compared to prior imaging.On exam, incisions appear to be healing well with no evidence of erythema, dehiscence, or drainage.  Patient will remain nonweightbearing to the right upper extremity.  He should continue to wear the sling at all times for the next 2 weeks.  He may discontinue the sling 4 weeks postoperatively.  Patient was provided with a referral to physical therapy with the clavicle ORIF rehab protocol.  The patient has completed aspirin 81 mg for DVT prophylaxis.  Patient was instructed to avoid scrubbing or soaking the incision for the next 2 weeks.  Patient may take Tylenol and oxycodone as needed for pain control.  Patient was instructed to reach out to the office for refill of meloxicam if he runs out after starting therapy.  He will follow-up in 4 weeks for re-evaluation and repeat x-rays of the right clavicle. The patient demonstrated understanding of the discussion and was in agreement with the plan.  All of the questions were answered.  Patient can reach out to clinic with any questions or concerns at any time. The patient was advised that anything we do could affect future care or surgery.    Patient was advised that if they have any fevers, chills, chest pain, shortness of breath, redness or drainage from the  incision, please let our office know immediately.      Weight bearing status: NWB RUE  ROM: per rehab protocol  Brace/Sling status: sling at all times except PT and hygiene for 2 more weeks. DC sling at 4 weeks. Begin therapy.  DVT Prophylaxis: ambulation  Follow up: 1 month  Repeat Imaging: xrays right clavicle        History of Present Illness:  The patient is a 20 y.o. male who is being evaluated post operatively 2 weeks status post ORIF right clavicle (DOS 7/1/2025)    The patient states that overall they are doing well since surgery. Patient states his pain is well controlled. Patient is not currently taking any pain medications. They are currently using a sling. They are nonweightbearing of the right upper extremity. The patient has completed aspirin 81 mg for DVT ppx.  The patient denies any fevers, chills, calf pain, chest pain/shortness of breath, redness or drainage from the incision.       I have reviewed the past medical, surgical, social and family history, medications and allergies as documented in the EMR.      Physical Exam:  right Shoulder focused exam:  Patient is in the sling  Steri strips intact and removed in clinic today. No erythema, no drainage, no dehiscence. Incision healing well.  Tender to palpation: none  ROM: deferred   Rotator cuff strength: deferred     UE NV Exam:   +2 Radial pulses   Sensation intact to light touch C5-T1 bilaterally, SILT median/ulnar/radial distribution  Radial/median/ulnar/PIN/AIN nerve motor intact    Imaging:   Xrays of the right clavicle were obtained on 7/16/2025 and reviewed with the patient. Based on my independent review, the imaging shows intact surgical hardware with no evidence of loosening or malfunction. Acceptable reduction and alignment of the fracture unchanged from previous imaging.      This note was written with the use of dictation software. Please excuse any errors.      Scribe Attestation      I,:  Jermaine Tejada PA-C am acting as a  scribe while in the presence of the attending physician.:       I,:  Alexandru Goodrich MD personally performed the services described in this documentation    as scribed in my presence.:

## 2025-07-22 ENCOUNTER — EVALUATION (OUTPATIENT)
Dept: PHYSICAL THERAPY | Facility: CLINIC | Age: 20
End: 2025-07-22
Payer: COMMERCIAL

## 2025-07-22 VITALS — HEART RATE: 69 BPM | SYSTOLIC BLOOD PRESSURE: 105 MMHG | DIASTOLIC BLOOD PRESSURE: 59 MMHG

## 2025-07-22 DIAGNOSIS — S42.021A CLOSED DISPLACED FRACTURE OF SHAFT OF RIGHT CLAVICLE, INITIAL ENCOUNTER: ICD-10-CM

## 2025-07-22 PROCEDURE — 97110 THERAPEUTIC EXERCISES: CPT | Performed by: PHYSICAL THERAPIST

## 2025-07-22 PROCEDURE — 97140 MANUAL THERAPY 1/> REGIONS: CPT | Performed by: PHYSICAL THERAPIST

## 2025-07-22 PROCEDURE — 97162 PT EVAL MOD COMPLEX 30 MIN: CPT | Performed by: PHYSICAL THERAPIST

## 2025-07-22 NOTE — PROGRESS NOTES
PT Evaluation     Today's date: 2025  Patient name: Hudson Waldron  : 2005  MRN: [id number]  Referring provider: Jermaine Tejada P*  Dx:   Encounter Diagnosis     ICD-10-CM    1. Closed displaced fracture of shaft of right clavicle, initial encounter  S42.021A Ambulatory Referral to Physical Therapy                     Assessment  Impairments: abnormal coordination, abnormal gait, abnormal muscle firing, abnormal muscle tone, abnormal or restricted ROM, abnormal movement, impaired physical strength, lacks appropriate home exercise program, pain with function, safety issue and weight-bearing intolerance  Symptom irritability: moderate    Assessment details: Hudson Waldron is a 20 y.o. male who presents to outpatient PT with a  Closed displaced fracture of shaft of right clavicle S/P ORIF right clavicle DOS  initial encounter.  No further referral appears necessary at this time based upon examination results. Pt presents with decreased strength, ROM, balance, functional activity tolerance, and pain with movement in his  which is  limiting his ability to perform the aforementioned functional activities.  Etiologic factors include repetitive poor body mechanics. Prognosis is good given HEP compliance and PT 2-3/wk.  Please contact me if you have any questions or recommendations.  Thank you for the opportunity to share in  [facility].     Understanding of Dx/Px/POC: good     Prognosis: good    Goals  STG to be achieved in 4 weeks     1. Pt will reduce subjective pain rating by at least 50 percent the help facilitate return to PLOF  2. Pt will improve right shoulder PROM/AROM by at least 10 degrees to help promote improved functional activity tolerance   3. Pt will improve MMT scores by at least 1/2 grade to promote improved functional activity tolerance      LTG to be achieved in 6-8 weeks   1. Pt will be complaint with HEP   2. Pt will improve Right shoulder AROM to WFL, to help facilitate  independence with ADL's, IADL's, and functional activities   3. Pt will improve Right shoulder Strength to WFL to help facilitate independence with ADL's, IADL's, and functional activities   4. Pt will have no limitations with reaching at and above shoulder height to help promote safe return to PLOF         Plan  Patient would benefit from: skilled physical therapy    Planned therapy interventions: ADL training, balance, balance/weight bearing training, flexibility, functional ROM exercises, gait training, graded activity, graded exercise, graded motor, home exercise program, joint mobilization, manual therapy, neuromuscular re-education, patient education, postural training, strengthening, stretching, therapeutic activities and therapeutic exercise    Frequency: 2x week  Duration in weeks: 12  Plan of Care beginning date: 7/22/2025  Plan of Care expiration date: 10/22/2025  Treatment plan discussed with: patient, PTA and referring physician      Subjective Evaluation    History of Present Illness  Mechanism of injury: 20 year old male. S/P right clavicle ORIF Dr. Joleen MADRID 6/20      Protocol Below    0-4 Weeks:   - Sling for 4 weeks   - PROM --> AAROM --> as tolerated, except for the following:   - No active IR/extension for 4 weeks   - PROM in biceps flexion for first 4 weeks   - ROM goals:    - Week 1: 120 degrees of forward flexion, 20 degrees ER with arm at the side, abduction max 75 degrees without rotation   - Week 2: 140 degrees of forward flexion, 40 degrees ER with arm at the side, abduction max 75 degrees without rotation   - No resisted IR/backward extension until 8-10 weeks post-op   -  strengthening ok     4-12 Weeks:   - Discontinue sling   - Begin AAROM --> AROM (by Week 6) for internal rotation and backwards extension as tolerated, if not already begun   - Goals:   - Increase ROM as tolerated with gentle passive stretching at end ranges   - Begin light resisted ER/FF/ABD: isometrics and bands,  concentric motions   - No resisted IR/backward extension until 8-10 weeks post-op     3-6 Months:   - Begin resisted IR/BE (isometrics/bands)-isometrics --> light bands --> weights   - Advance strengthening as tolerated - 10 reps/1 set per exercise for rotator cuff, deltoid and scapular stabilizers   - Increase ROM to full with passive stretching at end ranges   - Begin eccentric motion, plyometrics and closed chain exercises at 12 weeks       Will remain in sling until next week, Will DC sling at 4 weeks per protocol    Pt doing well. No complaints today   Patient Goals  Patient goals for therapy: increased strength, decreased pain and increased motion  Patient goal: to get better.  Pain  Current pain ratin  At best pain ratin  At worst pain rating: 3  Quality: sharp  Relieving factors: change in position, relaxation, rest and support  Exacerbated by: quick movement.  Progression: worsening        Objective     Passive Range of Motion     Right Shoulder   Flexion: 130 degrees   Abduction: 75 degrees   External rotation 0°: 20 degrees   Internal rotation 0°: 20 degrees     Additional Passive Range of Motion Details  Strength NT this date   Surgical incision(s) inspected. Incision(s) clean, dry and intact with no signs/symptoms of infection. Patient was educated on signs/symptoms of infection and was advised to contact MD immediately if suspect infection.                POC Expires Reeval for Medicare to be completed Unit LImit Auth Expiration Date PT/OT/STVisit Limit   10/22 By visit   9    Completed on                  VISIT TRACKER  DATE             Visit # 1            Remaining 8                    Precautions: S/P ORIF right clavicle, DOS  Follow protocol in chart       Manuals             PROM right shoulder  RR flex 130 abd 75 ER IR 20 arm at side                                                    Neuro Re-Ed                                                                                                         Ther Ex             Scap squeeze  5''20x            Pendulum 3 dir 30x             Elbow AROM              Digiflex  Green Composite individual, 5''20x            TB wrist bar.               Vanda             Finger ladder                           Ther Activity                                       Gait Training                                       Modalities

## 2025-07-25 ENCOUNTER — OFFICE VISIT (OUTPATIENT)
Dept: PHYSICAL THERAPY | Facility: CLINIC | Age: 20
End: 2025-07-25
Payer: COMMERCIAL

## 2025-07-25 DIAGNOSIS — S42.021A CLOSED DISPLACED FRACTURE OF SHAFT OF RIGHT CLAVICLE, INITIAL ENCOUNTER: Primary | ICD-10-CM

## 2025-07-25 PROCEDURE — 97110 THERAPEUTIC EXERCISES: CPT

## 2025-07-25 PROCEDURE — 97140 MANUAL THERAPY 1/> REGIONS: CPT

## 2025-07-25 NOTE — PROGRESS NOTES
Daily Note     Today's date: 2025  Patient name: Hudson Waldron  : 2005  MRN: [id number]  Referring provider: Jermaine Tejada P*  Dx:   Encounter Diagnosis     ICD-10-CM    1. Closed displaced fracture of shaft of right clavicle, initial encounter  S42.021A                      Subjective: Pt denies any pain prior to treatment today.       Objective: See treatment diary below      Assessment: Tolerated treatment well. Initiated exercises as outlined in POC. Patient demonstrated fatigue post treatment, exhibited good technique with therapeutic exercises, and would benefit from continued PT, progressing as per MD protocol.       Plan: Continue per plan of care.        Protocol Below    0-4 Weeks:   - Sling for 4 weeks   - PROM --> AAROM --> as tolerated, except for the following:   - No active IR/extension for 4 weeks   - PROM in biceps flexion for first 4 weeks   - ROM goals:    - Week 1: 120 degrees of forward flexion, 20 degrees ER with arm at the side, abduction max 75 degrees without rotation   - Week 2: 140 degrees of forward flexion, 40 degrees ER with arm at the side, abduction max 75 degrees without rotation   - No resisted IR/backward extension until 8-10 weeks post-op   -  strengthening ok     4-12 Weeks:   - Discontinue sling   - Begin AAROM --> AROM (by Week 6) for internal rotation and backwards extension as tolerated, if not already begun   - Goals:   - Increase ROM as tolerated with gentle passive stretching at end ranges   - Begin light resisted ER/FF/ABD: isometrics and bands, concentric motions   - No resisted IR/backward extension until 8-10 weeks post-op     3-6 Months:   - Begin resisted IR/BE (isometrics/bands)-isometrics --> light bands --> weights   - Advance strengthening as tolerated - 10 reps/1 set per exercise for rotator cuff, deltoid and scapular stabilizers   - Increase ROM to full with passive stretching at end ranges   - Begin eccentric motion, plyometrics and  "closed chain exercises at 12 weeks       Will remain in sling until next week, Will DC sling at 4 weeks per protocol      POC Expires Reeval for Medicare to be completed Unit LImit Auth Expiration Date PT/OT/STVisit Limit   10/22 By visit   9    Completed on                  VISIT TRACKER  DATE 7/22 7/25           Visit # 1 2           Remaining 8 7                   Precautions: S/P ORIF right clavicle, DOS 7/1 Follow protocol in chart       Manuals 7/22 7/25           PROM right shoulder  RR flex 130 abd 75 ER IR 20 arm at side  Flex 140 deg, Abd 75, ER 40 deg arm at side                                                  Neuro Re-Ed                                                                                                        Ther Ex             Scap squeeze  5''20x 5\" x20           Pendulum 3 dir 30x   X30 ea           Elbow AROM   NV           Digiflex  Green Composite individual, 5''20x Green Composite individual, 5''20x           TB wrist bar.    Red x20 ea flex ext           [place]  Flex x2 min            Finger ladder   NV                        Ther Activity                                       Gait Training                                       Modalities                                            "

## 2025-07-29 ENCOUNTER — OFFICE VISIT (OUTPATIENT)
Dept: PHYSICAL THERAPY | Facility: CLINIC | Age: 20
End: 2025-07-29
Payer: COMMERCIAL

## 2025-07-29 DIAGNOSIS — S42.021A CLOSED DISPLACED FRACTURE OF SHAFT OF RIGHT CLAVICLE, INITIAL ENCOUNTER: Primary | ICD-10-CM

## 2025-07-29 PROCEDURE — 97112 NEUROMUSCULAR REEDUCATION: CPT | Performed by: PHYSICAL THERAPIST

## 2025-07-29 PROCEDURE — 97140 MANUAL THERAPY 1/> REGIONS: CPT | Performed by: PHYSICAL THERAPIST

## 2025-08-01 ENCOUNTER — OFFICE VISIT (OUTPATIENT)
Dept: PHYSICAL THERAPY | Facility: CLINIC | Age: 20
End: 2025-08-01
Payer: COMMERCIAL

## 2025-08-01 DIAGNOSIS — S42.021A CLOSED DISPLACED FRACTURE OF SHAFT OF RIGHT CLAVICLE, INITIAL ENCOUNTER: Primary | ICD-10-CM

## 2025-08-01 PROCEDURE — 97140 MANUAL THERAPY 1/> REGIONS: CPT

## 2025-08-01 PROCEDURE — 97110 THERAPEUTIC EXERCISES: CPT

## 2025-08-05 ENCOUNTER — OFFICE VISIT (OUTPATIENT)
Dept: PHYSICAL THERAPY | Facility: CLINIC | Age: 20
End: 2025-08-05
Payer: COMMERCIAL

## 2025-08-05 DIAGNOSIS — S42.021A CLOSED DISPLACED FRACTURE OF SHAFT OF RIGHT CLAVICLE, INITIAL ENCOUNTER: Primary | ICD-10-CM

## 2025-08-05 PROCEDURE — 97140 MANUAL THERAPY 1/> REGIONS: CPT | Performed by: PHYSICAL THERAPIST

## 2025-08-05 PROCEDURE — 97110 THERAPEUTIC EXERCISES: CPT | Performed by: PHYSICAL THERAPIST

## 2025-08-07 ENCOUNTER — TELEPHONE (OUTPATIENT)
Age: 20
End: 2025-08-07

## 2025-08-12 ENCOUNTER — OFFICE VISIT (OUTPATIENT)
Dept: PHYSICAL THERAPY | Facility: CLINIC | Age: 20
End: 2025-08-12
Payer: COMMERCIAL

## 2025-08-13 ENCOUNTER — APPOINTMENT (OUTPATIENT)
Dept: RADIOLOGY | Facility: CLINIC | Age: 20
End: 2025-08-13
Attending: STUDENT IN AN ORGANIZED HEALTH CARE EDUCATION/TRAINING PROGRAM
Payer: COMMERCIAL

## 2025-08-13 PROCEDURE — 73000 X-RAY EXAM OF COLLAR BONE: CPT

## 2025-08-19 ENCOUNTER — OFFICE VISIT (OUTPATIENT)
Dept: PHYSICAL THERAPY | Facility: CLINIC | Age: 20
End: 2025-08-19
Payer: COMMERCIAL

## 2025-08-19 DIAGNOSIS — S42.021A CLOSED DISPLACED FRACTURE OF SHAFT OF RIGHT CLAVICLE, INITIAL ENCOUNTER: Primary | ICD-10-CM

## 2025-08-19 PROCEDURE — 97140 MANUAL THERAPY 1/> REGIONS: CPT

## 2025-08-19 PROCEDURE — 97110 THERAPEUTIC EXERCISES: CPT

## 2025-08-19 PROCEDURE — 97112 NEUROMUSCULAR REEDUCATION: CPT

## 2025-08-22 ENCOUNTER — OFFICE VISIT (OUTPATIENT)
Dept: PHYSICAL THERAPY | Facility: CLINIC | Age: 20
End: 2025-08-22
Payer: COMMERCIAL

## 2025-08-22 DIAGNOSIS — S42.021A CLOSED DISPLACED FRACTURE OF SHAFT OF RIGHT CLAVICLE, INITIAL ENCOUNTER: Primary | ICD-10-CM

## 2025-08-22 PROCEDURE — 97140 MANUAL THERAPY 1/> REGIONS: CPT

## 2025-08-22 PROCEDURE — 97110 THERAPEUTIC EXERCISES: CPT

## (undated) DEVICE — TUBING SUCTION 5MM X 12 FT

## (undated) DEVICE — INTENDED FOR TISSUE SEPARATION, AND OTHER PROCEDURES THAT REQUIRE A SHARP SURGICAL BLADE TO PUNCTURE OR CUT.: Brand: BARD-PARKER ® CARBON RIB-BACK BLADES

## (undated) DEVICE — ELECTRODE NEEDLE MOD E-Z CLEAN 2.75IN 7CM -0013M

## (undated) DEVICE — Device

## (undated) DEVICE — HEWSON SUTURE RETRIEVER: Brand: HEWSON SUTURE RETRIEVER

## (undated) DEVICE — 3M™ COBAN™ NL STERILE NON-LATEX SELF-ADHERENT WRAP, 2084S, 4 IN X 5 YD (10 CM X 4,5 M), 18 ROLLS/CASE: Brand: 3M™ COBAN™

## (undated) DEVICE — IMPLANTABLE DEVICE
Type: IMPLANTABLE DEVICE | Site: CLAVICLE | Status: NON-FUNCTIONAL
Removed: 2025-07-01

## (undated) DEVICE — C-ARM: Brand: UNBRANDED

## (undated) DEVICE — GLOVE SRG BIOGEL ECLIPSE 6.5

## (undated) DEVICE — STERILE MANDIBLE PACK: Brand: CARDINAL HEALTH

## (undated) DEVICE — 3000CC GUARDIAN II: Brand: GUARDIAN

## (undated) DEVICE — STOCKINETTE,IMPERVIOUS,12X48,STERILE: Brand: MEDLINE

## (undated) DEVICE — 3M™ STERI-STRIP™ REINFORCED ADHESIVE SKIN CLOSURES, R1547, 1/2 IN X 4 IN (12 MM X 100 MM), 6 STRIPS/ENVELOPE: Brand: 3M™ STERI-STRIP™

## (undated) DEVICE — 10FR FRAZIER SUCTION HANDLE: Brand: CARDINAL HEALTH

## (undated) DEVICE — 3M™ IOBAN™ 2 ANTIMICROBIAL INCISE DRAPE 6648EZ: Brand: IOBAN™ 2

## (undated) DEVICE — INSTRUMENT POUCH: Brand: CONVERTORS

## (undated) DEVICE — ARM SLING: Brand: DEROYAL

## (undated) DEVICE — 3M™ STERI-DRAPE™ U-DRAPE 1015: Brand: STERI-DRAPE™

## (undated) DEVICE — BB-TAK THREADED

## (undated) DEVICE — NEPTUNE E-SEP SMOKE EVACUATION PENCIL, COATED, 70MM BLADE, PUSH BUTTON SWITCH: Brand: NEPTUNE E-SEP

## (undated) DEVICE — GAUZE SPONGES,16 PLY: Brand: CURITY

## (undated) DEVICE — DRAPE,U/ SHT,SPLIT,PLAS,STERIL: Brand: MEDLINE

## (undated) DEVICE — GLOVE INDICATOR PI UNDERGLOVE SZ 6.5 BLUE

## (undated) DEVICE — PACK PBDS SHOULDER ARTHROSCOPY RF